# Patient Record
Sex: FEMALE | Race: AMERICAN INDIAN OR ALASKA NATIVE
[De-identification: names, ages, dates, MRNs, and addresses within clinical notes are randomized per-mention and may not be internally consistent; named-entity substitution may affect disease eponyms.]

---

## 2018-03-14 ENCOUNTER — HOSPITAL ENCOUNTER (EMERGENCY)
Dept: HOSPITAL 5 - ED | Age: 23
Discharge: LEFT BEFORE DISCHARGE | End: 2018-03-14
Payer: SELF-PAY

## 2018-03-14 VITALS — SYSTOLIC BLOOD PRESSURE: 118 MMHG | DIASTOLIC BLOOD PRESSURE: 74 MMHG

## 2018-03-14 DIAGNOSIS — R10.9: ICD-10-CM

## 2018-03-14 DIAGNOSIS — O26.899: Primary | ICD-10-CM

## 2018-03-14 DIAGNOSIS — Z3A.00: ICD-10-CM

## 2018-03-14 LAB
BILIRUB UR QL STRIP: (no result)
BLOOD UR QL VISUAL: (no result)
MUCOUS THREADS #/AREA URNS HPF: (no result) /HPF
PH UR STRIP: 7 [PH] (ref 5–7)
RBC #/AREA URNS HPF: < 1 /HPF (ref 0–6)
UROBILINOGEN UR-MCNC: < 2 MG/DL (ref ?–2)
WBC #/AREA URNS HPF: 1 /HPF (ref 0–6)

## 2018-03-14 PROCEDURE — 99283 EMERGENCY DEPT VISIT LOW MDM: CPT

## 2018-03-14 PROCEDURE — 81025 URINE PREGNANCY TEST: CPT

## 2018-03-14 PROCEDURE — 81001 URINALYSIS AUTO W/SCOPE: CPT

## 2018-03-14 NOTE — EMERGENCY DEPARTMENT REPORT
Chief Complaint: Abdominal Pain


Stated Complaint: ABD PAIN


Time Seen by Provider: 18 19:58





- HPI


History of Present Illness: 





The patient is a 22-year-old female , unknown EGA, who presents for 

evaluation of abdominal pain.  The patient reports cramping lower abdominal 

pain for the past 2 weeks.  She states that her symptoms progressed throughout 

the past one week, and since she has experienced generalized myalgias. The 

patient denies fever, chills, night sweats, chest pain, dyspnea, trauma to the 

abdomen, vaginal bleeding, leakage of fluid from the vagina, vaginal discharge, 

diarrhea, blood in the stool, dark tarry stool, dysuria, hematuria, flank pain. 








- Exam


Vital Signs: 


 Vital Signs











  18





  16:36


 


Temperature 98.8 F


 


Pulse Rate 85


 


Respiratory 18





Rate 


 


Blood Pressure 118/74


 


O2 Sat by Pulse 98





Oximetry 











MSE screening note: 


Focused history and physical exam performed.


Due to findings the following was ordered:











ED Medical Decision Making





- Medical Decision Making








Initial lab tests revealed a positive urine present tests. The patient was 

informed of positive urine pregnancy test.  She was recommended to receive 

serum beta hCG and ultrasound of the pelvis to investigate if positive for 

intrauterine pregnancy or ectopic pregnancy.  The patient refuses phlebotomy 

collection of blood and ultrasound secondary to concerns regarding billing.  

The patient demanded to speak with billing and registration prior to any 

further diagnostic tests or treatments. The patient is informed of risks of 

refusal of further care and evaluation, including potential risk of increased 

morbidity and/or death, versus the benefits of further treatment and evaluation 

of her pregnancy with ultrasound, and she continued to decline further 

evaluation or treatment. 





She was asked by the nursing staff to relocate to the [facility] from the medical 

screening room while considering whether to proceed with recommended evaluation 

and treatment plan/while speaking with registration/billing.  She refused to 

relocate, stating that she did not want to relocate to the waiting room because 

the seats were not comfortable, and that she in fact needed to be placed in a 

room in the ED more comfortable than the room she was currently in.  She was 

informed of the lack of an additional room due to overcapacity and inability to 

accommodate her request for more comfortable room/bed.  The patient became upset

, uncooperative, and belligerent.  The patient verbally insulted multiple ED 

staff and became disruptive to ability of staff to monitor and treat other ED 

patients.  Security was called and the patient became even agitated.  As the 

patient is competent to refuse further care and is refusing to allow further 

evaluation or treatment, the patient is allowed to leave against medical 

advice. 








ED Disposition for MSE


Clinical Impression: 


 Abdominal pain, acute





Abdominal pain during pregnancy


Qualifiers:


 Trimester: unspecified trimester Qualified Code(s): O26.899 - Other specified 

pregnancy related conditions, unspecified trimester; R10.9 - Unspecified 

abdominal pain





Disposition:  LEFT AGAINST MED ADVICE


Is pt being admited?: No


Does the pt Need Aspirin: No


Condition: Stable


Instructions:  Abdominal Pain (ED)


Referrals: 


PRIMARY CARE,MD [Primary Care Provider] - 3-5 Days


Time of Disposition: 21:09

## 2018-05-30 ENCOUNTER — HOSPITAL ENCOUNTER (EMERGENCY)
Dept: HOSPITAL 5 - ED | Age: 23
Discharge: LEFT BEFORE DISCHARGE | End: 2018-05-30
Payer: SELF-PAY

## 2018-05-30 DIAGNOSIS — Z04.2: Primary | ICD-10-CM

## 2018-05-30 DIAGNOSIS — Z53.21: ICD-10-CM
